# Patient Record
Sex: FEMALE | Race: WHITE | NOT HISPANIC OR LATINO | Employment: FULL TIME | URBAN - METROPOLITAN AREA
[De-identification: names, ages, dates, MRNs, and addresses within clinical notes are randomized per-mention and may not be internally consistent; named-entity substitution may affect disease eponyms.]

---

## 2017-06-26 ENCOUNTER — HISTORICAL (OUTPATIENT)
Dept: RADIOLOGY | Facility: HOSPITAL | Age: 47
End: 2017-06-26

## 2017-07-19 ENCOUNTER — HISTORICAL (OUTPATIENT)
Dept: RADIOLOGY | Facility: HOSPITAL | Age: 47
End: 2017-07-19

## 2017-08-14 ENCOUNTER — HISTORICAL (OUTPATIENT)
Dept: LAB | Facility: HOSPITAL | Age: 47
End: 2017-08-14

## 2017-08-14 LAB
ABS NEUT (OLG): 5.64 X10(3)/MCL (ref 2.1–9.2)
B-HCG SERPL QL: NEGATIVE
BASOPHILS # BLD AUTO: 0 X10(3)/MCL (ref 0–0.2)
BASOPHILS NFR BLD AUTO: 0 %
EOSINOPHIL # BLD AUTO: 0.1 X10(3)/MCL (ref 0–0.9)
EOSINOPHIL NFR BLD AUTO: 1 %
ERYTHROCYTE [DISTWIDTH] IN BLOOD BY AUTOMATED COUNT: 12.2 % (ref 11.5–17)
HCT VFR BLD AUTO: 43.9 % (ref 37–47)
HGB BLD-MCNC: 15.3 GM/DL (ref 12–16)
LYMPHOCYTES # BLD AUTO: 1.4 X10(3)/MCL (ref 0.6–4.6)
LYMPHOCYTES NFR BLD AUTO: 18 %
MCH RBC QN AUTO: 32.6 PG (ref 27–31)
MCHC RBC AUTO-ENTMCNC: 34.9 GM/DL (ref 33–36)
MCV RBC AUTO: 93.6 FL (ref 80–94)
MONOCYTES # BLD AUTO: 0.5 X10(3)/MCL (ref 0.1–1.3)
MONOCYTES NFR BLD AUTO: 6 %
NEUTROPHILS # BLD AUTO: 5.64 X10(3)/MCL (ref 1.4–7.9)
NEUTROPHILS NFR BLD AUTO: 73 %
PLATELET # BLD AUTO: 203 X10(3)/MCL (ref 130–400)
PMV BLD AUTO: 10.4 FL (ref 9.4–12.4)
RBC # BLD AUTO: 4.69 X10(6)/MCL (ref 4.2–5.4)
WBC # SPEC AUTO: 7.7 X10(3)/MCL (ref 4.5–11.5)

## 2017-08-16 ENCOUNTER — HISTORICAL (OUTPATIENT)
Dept: ADMINISTRATIVE | Facility: HOSPITAL | Age: 47
End: 2017-08-16

## 2018-01-24 ENCOUNTER — HISTORICAL (OUTPATIENT)
Dept: RADIOLOGY | Facility: HOSPITAL | Age: 48
End: 2018-01-24

## 2018-01-25 ENCOUNTER — HISTORICAL (OUTPATIENT)
Dept: ADMINISTRATIVE | Facility: HOSPITAL | Age: 48
End: 2018-01-25

## 2018-01-25 LAB — CANCER AG125 SERPL-ACNC: 15.7 IU/ML (ref 1.5–35)

## 2018-07-27 ENCOUNTER — HISTORICAL (OUTPATIENT)
Dept: RADIOLOGY | Facility: HOSPITAL | Age: 48
End: 2018-07-27

## 2019-01-21 ENCOUNTER — HISTORICAL (OUTPATIENT)
Dept: RADIOLOGY | Facility: HOSPITAL | Age: 49
End: 2019-01-21

## 2019-07-29 ENCOUNTER — HISTORICAL (OUTPATIENT)
Dept: RADIOLOGY | Facility: HOSPITAL | Age: 49
End: 2019-07-29

## 2019-08-15 ENCOUNTER — HISTORICAL (OUTPATIENT)
Dept: ADMINISTRATIVE | Facility: HOSPITAL | Age: 49
End: 2019-08-15

## 2019-08-15 LAB — DEPRECATED CALCIDIOL+CALCIFEROL SERPL-MC: 60.41 NG/ML (ref 30–80)

## 2020-10-20 ENCOUNTER — HISTORICAL (OUTPATIENT)
Dept: RADIOLOGY | Facility: HOSPITAL | Age: 50
End: 2020-10-20

## 2021-07-14 ENCOUNTER — HISTORICAL (OUTPATIENT)
Dept: RADIOLOGY | Facility: HOSPITAL | Age: 51
End: 2021-07-14

## 2021-07-19 ENCOUNTER — HISTORICAL (OUTPATIENT)
Dept: ADMINISTRATIVE | Facility: HOSPITAL | Age: 51
End: 2021-07-19

## 2021-07-19 LAB — CANCER AG125 SERPL-ACNC: 27.2 UNIT/ML (ref 0–35)

## 2021-10-27 ENCOUNTER — HISTORICAL (OUTPATIENT)
Dept: RADIOLOGY | Facility: HOSPITAL | Age: 51
End: 2021-10-27

## 2022-04-30 NOTE — OP NOTE
DATE OF SURGERY:    08/16/2017    SURGEON:  Matias Lui MD    PROCEDURE PERFORMED:  Dilation and curettage, NovaSure endometrial ablation, laparoscopic tubal cauterization.    PREOPERATIVE DIAGNOSIS:  Patient is a 46-year-old white female with menorrhagia unresponsive to conservative measures.    POSTOPERATIVE DIAGNOSIS:  Patient is a 46-year-old white female with menorrhagia unresponsive to conservative measures, plus uterine fibroids.    PROCEDURE IN DETAIL:  After the proper consents were obtained, the patient was brought to the operating room, placed in supine position and underwent general anesthesia and intubation without difficulty.  She was then placed in dorsal lithotomy position, prepped and draped in sterile fashion. A weighted speculum was placed in the vaginal vault.  The  anterior lip of the cervix was grasped with a single tooth tenaculum. Uterus was sounded to 9 cm.  The cervix was dilated to a size #8 Hegar.  At this point in time, a sharp curettage was then performed.  Curettage was continued until there was a gritty consistency felt throughout the cavity.  The tissue was sent to pathology.  Next part of the procedure, involved endometrial ablation, which was accomplished with a NovaSure apparatus.  The uterine width was 4.0 and the uterine length was 4.0 also. There was no system malfunctions or complications.  At this point in time, an [place] manipulator was placed into the cervical canal.  A small incision was made underneath the umbilicus and also two fingerbreadth above the pubic symphysis.  The Veress needle was placed into the abdominal cavity and 2  liters of CO2 were allowed to enter the abdominal cavity. Trocars were then introduced without difficulty.  Contents of the pelvic cavity examined and uterus was noted to be slightly enlarged secondary to uterine fibroids. Both adnexa were within normal limits. At this point in time, the right fallopian tube was grasped with the  bipolar cautery and cauterized in three adjacent spots.  A like procedure was done on the opposite fallopian tube.  There was no evidence of any bleeding around the fallopian tubes.  One final inspection of pelvis showed no evidence of any bleeding, no evidence of any visceral injuries, there was no evidence of endometriosis, PID or adhesions.  At this point in time, the CO2 was allowed to escape from the abdominal cavity. Trocars were removed without difficulty. The small     incisions were closed with 3-0 Vicryl subcuticular stitch.  The patient was carefully brought out of dorsal lithotomy position and brought to recovery in stable fashion tolerating procedure well.        ______________________________  Matias Lui MD CEP/THELMA  DD:  08/16/2017  Time:  11:00AM  DT:  08/16/2017  Time:  03:46PM  Job #:  [id number]

## 2022-10-12 DIAGNOSIS — Z12.31 ENCOUNTER FOR SCREENING MAMMOGRAM FOR MALIGNANT NEOPLASM OF BREAST: Primary | ICD-10-CM

## 2022-11-07 DIAGNOSIS — E04.1 NONTOXIC UNINODULAR GOITER: Primary | ICD-10-CM

## 2022-11-08 ENCOUNTER — HOSPITAL ENCOUNTER (OUTPATIENT)
Dept: RADIOLOGY | Facility: HOSPITAL | Age: 52
Discharge: HOME | End: 2022-11-08
Attending: OBSTETRICS & GYNECOLOGY
Payer: COMMERCIAL

## 2022-11-08 ENCOUNTER — HOSPITAL ENCOUNTER (OUTPATIENT)
Dept: RADIOLOGY | Facility: HOSPITAL | Age: 52
Discharge: HOME | End: 2022-11-08
Attending: PEDIATRICS
Payer: COMMERCIAL

## 2022-11-08 DIAGNOSIS — E04.1 NONTOXIC UNINODULAR GOITER: ICD-10-CM

## 2022-11-08 DIAGNOSIS — Z12.31 ENCOUNTER FOR SCREENING MAMMOGRAM FOR MALIGNANT NEOPLASM OF BREAST: ICD-10-CM

## 2022-11-08 PROCEDURE — 77067 SCR MAMMO BI INCL CAD: CPT | Mod: TC

## 2022-11-08 PROCEDURE — 76536 US EXAM OF HEAD AND NECK: CPT | Mod: TC

## 2022-11-08 PROCEDURE — 77063 MAMMO DIGITAL SCREENING BILAT WITH TOMO: ICD-10-PCS | Mod: 26,,, | Performed by: RADIOLOGY

## 2022-11-08 PROCEDURE — 77063 BREAST TOMOSYNTHESIS BI: CPT | Mod: 26,,, | Performed by: RADIOLOGY

## 2022-11-08 PROCEDURE — 77067 MAMMO DIGITAL SCREENING BILAT WITH TOMO: ICD-10-PCS | Mod: 26,,, | Performed by: RADIOLOGY

## 2022-11-08 PROCEDURE — 77067 SCR MAMMO BI INCL CAD: CPT | Mod: 26,,, | Performed by: RADIOLOGY

## 2022-11-22 ENCOUNTER — HOSPITAL ENCOUNTER (OUTPATIENT)
Dept: RADIOLOGY | Facility: HOSPITAL | Age: 52
Discharge: HOME | End: 2022-11-22
Attending: OTOLARYNGOLOGY
Payer: COMMERCIAL

## 2022-11-22 DIAGNOSIS — E04.1 THYROID NODULE: ICD-10-CM

## 2022-11-22 PROCEDURE — 60100 BIOPSY OF THYROID: CPT

## 2022-11-22 PROCEDURE — 76942 ECHO GUIDE FOR BIOPSY: CPT | Mod: TC

## 2022-11-25 LAB
DHEA SERPL-MCNC: NORMAL
ESTROGEN SERPL-MCNC: NORMAL PG/ML
INSULIN SERPL-ACNC: NORMAL U[IU]/ML
LAB AP CLINICAL INFORMATION: NORMAL
LAB AP GROSS DESCRIPTION: NORMAL
LAB AP INTRA OP: NORMAL
LAB AP REPORT FOOTNOTES: NORMAL
T3RU NFR SERPL: NORMAL %

## 2022-12-29 DIAGNOSIS — E04.1 NONTOXIC UNINODULAR GOITER: Primary | ICD-10-CM

## 2023-05-03 ENCOUNTER — HOSPITAL ENCOUNTER (OUTPATIENT)
Dept: RADIOLOGY | Facility: HOSPITAL | Age: 53
Discharge: HOME | End: 2023-05-03
Attending: OTOLARYNGOLOGY
Payer: COMMERCIAL

## 2023-05-03 DIAGNOSIS — E04.1 NONTOXIC UNINODULAR GOITER: ICD-10-CM

## 2023-05-03 PROCEDURE — 76536 US EXAM OF HEAD AND NECK: CPT | Mod: TC

## 2023-09-18 ENCOUNTER — LAB VISIT (OUTPATIENT)
Dept: LAB | Facility: HOSPITAL | Age: 53
End: 2023-09-18
Attending: OBSTETRICS & GYNECOLOGY
Payer: COMMERCIAL

## 2023-09-18 DIAGNOSIS — R10.2 PERINEAL NEURALGIA, UNSPECIFIED LATERALITY: Primary | ICD-10-CM

## 2023-09-18 LAB
APPEARANCE UR: CLEAR
BACTERIA #/AREA URNS AUTO: ABNORMAL /HPF
BILIRUB UR QL STRIP.AUTO: NEGATIVE
COLOR UR: YELLOW
GLUCOSE UR QL STRIP.AUTO: NEGATIVE
KETONES UR QL STRIP.AUTO: NEGATIVE
LEUKOCYTE ESTERASE UR QL STRIP.AUTO: ABNORMAL
NITRITE UR QL STRIP.AUTO: NEGATIVE
PH UR STRIP.AUTO: 6 [PH]
PROT UR QL STRIP.AUTO: NEGATIVE
RBC #/AREA URNS AUTO: <5 /HPF
RBC UR QL AUTO: ABNORMAL
SP GR UR STRIP.AUTO: 1.01 (ref 1–1.03)
SQUAMOUS #/AREA URNS AUTO: <5 /HPF
UROBILINOGEN UR STRIP-ACNC: 0.2
WBC #/AREA URNS AUTO: 6 /HPF

## 2023-09-18 PROCEDURE — 81001 URINALYSIS AUTO W/SCOPE: CPT

## 2023-10-06 ENCOUNTER — HOSPITAL ENCOUNTER (OUTPATIENT)
Dept: RADIOLOGY | Facility: HOSPITAL | Age: 53
Discharge: HOME | End: 2023-10-06
Attending: OTOLARYNGOLOGY
Payer: COMMERCIAL

## 2023-10-06 DIAGNOSIS — E04.1 THYROID NODULE: ICD-10-CM

## 2023-10-06 PROCEDURE — 60100 BIOPSY OF THYROID: CPT

## 2023-10-06 RX ORDER — LIDOCAINE HYDROCHLORIDE 10 MG/ML
INJECTION INFILTRATION; PERINEURAL
Status: DISPENSED
Start: 2023-10-06 | End: 2023-10-06

## 2023-10-09 LAB — PSYCHE PATHOLOGY RESULT: NORMAL

## 2023-11-16 DIAGNOSIS — R10.32 ABDOMINAL PAIN, LEFT LOWER QUADRANT: ICD-10-CM

## 2023-11-16 DIAGNOSIS — R10.11 ABDOMINAL PAIN, RIGHT UPPER QUADRANT: Primary | ICD-10-CM

## 2023-11-16 DIAGNOSIS — R10.30 LOWER ABDOMINAL PAIN: ICD-10-CM

## 2023-12-05 ENCOUNTER — HOSPITAL ENCOUNTER (OUTPATIENT)
Dept: RADIOLOGY | Facility: HOSPITAL | Age: 53
Discharge: HOME | End: 2023-12-05
Attending: PHYSICIAN ASSISTANT
Payer: COMMERCIAL

## 2023-12-05 DIAGNOSIS — R10.11 ABDOMINAL PAIN, RIGHT UPPER QUADRANT: ICD-10-CM

## 2023-12-05 DIAGNOSIS — R10.32 ABDOMINAL PAIN, LEFT LOWER QUADRANT: ICD-10-CM

## 2023-12-05 DIAGNOSIS — R10.30 LOWER ABDOMINAL PAIN: ICD-10-CM

## 2023-12-05 LAB
CREAT SERPL-MCNC: 0.8 MG/DL (ref 0.5–1.4)
SAMPLE: NORMAL

## 2023-12-05 PROCEDURE — 25500020 PHARM REV CODE 255: Performed by: PHYSICIAN ASSISTANT

## 2023-12-05 PROCEDURE — 74178 CT ABD&PLV WO CNTR FLWD CNTR: CPT | Mod: TC

## 2023-12-05 RX ADMIN — IOPAMIDOL 100 ML: 755 INJECTION, SOLUTION INTRAVENOUS at 01:12

## 2024-07-29 DIAGNOSIS — E21.0 PRIMARY HYPERPARATHYROIDISM: Primary | ICD-10-CM

## 2024-08-06 ENCOUNTER — HOSPITAL ENCOUNTER (OUTPATIENT)
Dept: RADIOLOGY | Facility: HOSPITAL | Age: 54
Discharge: HOME | End: 2024-08-06
Attending: INTERNAL MEDICINE
Payer: COMMERCIAL

## 2024-08-06 DIAGNOSIS — E21.0 PRIMARY HYPERPARATHYROIDISM: ICD-10-CM

## 2024-08-06 PROCEDURE — 25500020 PHARM REV CODE 255: Performed by: INTERNAL MEDICINE

## 2024-08-06 PROCEDURE — 70492 CT SFT TSUE NCK W/O & W/DYE: CPT | Mod: TC

## 2024-08-06 RX ADMIN — IOHEXOL 100 ML: 350 INJECTION, SOLUTION INTRAVENOUS at 04:08

## 2024-08-13 DIAGNOSIS — E04.1 NONTOXIC UNINODULAR GOITER: Primary | ICD-10-CM

## 2024-08-14 DIAGNOSIS — E04.1 NONTOXIC SINGLE THYROID NODULE: Primary | ICD-10-CM

## 2024-08-21 ENCOUNTER — HOSPITAL ENCOUNTER (OUTPATIENT)
Dept: RADIOLOGY | Facility: HOSPITAL | Age: 54
Discharge: HOME | End: 2024-08-21
Attending: PEDIATRICS
Payer: COMMERCIAL

## 2024-08-21 DIAGNOSIS — Z12.31 ENCOUNTER FOR SCREENING MAMMOGRAM FOR BREAST CANCER: ICD-10-CM

## 2024-08-21 PROCEDURE — 77067 SCR MAMMO BI INCL CAD: CPT | Mod: TC

## 2024-08-21 PROCEDURE — 77067 SCR MAMMO BI INCL CAD: CPT | Mod: 26,,, | Performed by: STUDENT IN AN ORGANIZED HEALTH CARE EDUCATION/TRAINING PROGRAM

## 2024-08-21 PROCEDURE — 77063 BREAST TOMOSYNTHESIS BI: CPT | Mod: 26,,, | Performed by: STUDENT IN AN ORGANIZED HEALTH CARE EDUCATION/TRAINING PROGRAM

## 2024-09-25 ENCOUNTER — LAB VISIT (OUTPATIENT)
Dept: LAB | Facility: HOSPITAL | Age: 54
End: 2024-09-25
Attending: OTOLARYNGOLOGY
Payer: COMMERCIAL

## 2024-09-25 DIAGNOSIS — E83.52 HYPERCALCEMIA: ICD-10-CM

## 2024-09-25 DIAGNOSIS — E83.52 HYPERCALCEMIA: Primary | ICD-10-CM

## 2024-09-25 LAB — CALCIUM SERPL-MCNC: 9.6 MG/DL (ref 8.4–10.2)

## 2024-09-25 PROCEDURE — 36415 COLL VENOUS BLD VENIPUNCTURE: CPT

## 2024-12-03 DIAGNOSIS — N20.0 CALCULUS OF KIDNEY: Primary | ICD-10-CM

## 2024-12-06 ENCOUNTER — HOSPITAL ENCOUNTER (OUTPATIENT)
Dept: RADIOLOGY | Facility: HOSPITAL | Age: 54
Discharge: HOME | End: 2024-12-06
Attending: PEDIATRICS
Payer: COMMERCIAL

## 2024-12-06 DIAGNOSIS — N20.0 CALCULUS OF KIDNEY: ICD-10-CM

## 2024-12-06 PROCEDURE — 74176 CT ABD & PELVIS W/O CONTRAST: CPT | Mod: TC

## 2025-01-24 ENCOUNTER — HOSPITAL ENCOUNTER (OUTPATIENT)
Dept: RADIOLOGY | Facility: HOSPITAL | Age: 55
Discharge: HOME | End: 2025-01-24
Attending: PEDIATRICS
Payer: COMMERCIAL

## 2025-01-24 DIAGNOSIS — M79.644 PAIN IN FINGER OF RIGHT HAND: ICD-10-CM

## 2025-01-24 PROCEDURE — 73130 X-RAY EXAM OF HAND: CPT | Mod: TC,RT

## 2025-03-24 ENCOUNTER — HOSPITAL ENCOUNTER (OUTPATIENT)
Dept: RADIOLOGY | Facility: CLINIC | Age: 55
Discharge: HOME | End: 2025-03-24
Attending: ORTHOPAEDIC SURGERY
Payer: COMMERCIAL

## 2025-03-24 ENCOUNTER — OFFICE VISIT (OUTPATIENT)
Dept: ORTHOPEDICS | Facility: CLINIC | Age: 55
End: 2025-03-24
Payer: COMMERCIAL

## 2025-03-24 VITALS
WEIGHT: 174 LBS | BODY MASS INDEX: 30.83 KG/M2 | HEART RATE: 85 BPM | SYSTOLIC BLOOD PRESSURE: 125 MMHG | HEIGHT: 63 IN | DIASTOLIC BLOOD PRESSURE: 84 MMHG

## 2025-03-24 DIAGNOSIS — M79.641 HAND PAIN, RIGHT: ICD-10-CM

## 2025-03-24 DIAGNOSIS — M79.641 HAND PAIN, RIGHT: Primary | ICD-10-CM

## 2025-03-24 DIAGNOSIS — M85.441: ICD-10-CM

## 2025-03-24 DIAGNOSIS — M84.344A: ICD-10-CM

## 2025-03-24 PROCEDURE — 1159F MED LIST DOCD IN RCRD: CPT | Mod: CPTII,,, | Performed by: ORTHOPAEDIC SURGERY

## 2025-03-24 PROCEDURE — 3079F DIAST BP 80-89 MM HG: CPT | Mod: CPTII,,, | Performed by: ORTHOPAEDIC SURGERY

## 2025-03-24 PROCEDURE — 73130 X-RAY EXAM OF HAND: CPT | Mod: RT,,, | Performed by: ORTHOPAEDIC SURGERY

## 2025-03-24 PROCEDURE — 3008F BODY MASS INDEX DOCD: CPT | Mod: CPTII,,, | Performed by: ORTHOPAEDIC SURGERY

## 2025-03-24 PROCEDURE — 1160F RVW MEDS BY RX/DR IN RCRD: CPT | Mod: CPTII,,, | Performed by: ORTHOPAEDIC SURGERY

## 2025-03-24 PROCEDURE — 99204 OFFICE O/P NEW MOD 45 MIN: CPT | Mod: ,,, | Performed by: ORTHOPAEDIC SURGERY

## 2025-03-24 PROCEDURE — 3074F SYST BP LT 130 MM HG: CPT | Mod: CPTII,,, | Performed by: ORTHOPAEDIC SURGERY

## 2025-03-24 RX ORDER — NEBIVOLOL 20 MG/1
1 TABLET ORAL EVERY MORNING
COMMUNITY

## 2025-03-24 RX ORDER — HYDROCHLOROTHIAZIDE 25 MG/1
25 TABLET ORAL EVERY MORNING
COMMUNITY

## 2025-03-24 RX ORDER — ROSUVASTATIN CALCIUM 40 MG/1
TABLET, COATED ORAL
COMMUNITY

## 2025-03-24 RX ORDER — NIFEDIPINE 30 MG/1
1 TABLET, EXTENDED RELEASE ORAL EVERY MORNING
COMMUNITY

## 2025-03-25 NOTE — PROGRESS NOTES
"Subjective:    CC: Hand Pain (Right thumb pain and swelling. Pain started in Jan 2025. Patient states she had a second xray with Dr Hood and it showed her thumb was fractured but wasn't able to bring those images. She wanted a second opinion.)       HPI:  Patient comes in today for her 1st visit.  Patient complains of pain swelling mass over her right thumb for the last 2+ months.  She states it is not improved.  She was seen at an outside clinic x2, questionable fracture.  She continues to have pain and discomfort.    ROS: Refer to HPI for pertinent ROS. All other 12 point systems negative.    Objective:  Vitals:    03/24/25 0951   BP: 125/84   BP Location: Left arm   Patient Position: Sitting   Pulse: 85   Weight: 78.9 kg (174 lb)   Height: 5' 3" (1.6 m)        Physical Exam:  Patient is well-nourished developed female she is awake alert and oriented x3 she is in no apparent distress he is pleasant and cooperative.  Examination of the right hand compartment soft and warm.  Skin is intact.  There is no signs symptoms of DVT or infection.  She is point tender about the IP joint of the right thumb this is a loss of flexion and extension.  There is either a soft tissue mass or a bony mass along the volar lateral aspect.  It is tender to palpation.  Neurovascular intact distally.    Images:  X-rays three views right hand demonstrate no obvious fracture or dislocation. Images Reviewed and discussed with patient.    Assessment:  1. 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 Stress fracture of phalanx of finger of right hand, initial encounter  - MRI Thumb Without Contrast Right; Future        Plan:  This time we discussed her physical exam and x-ray findings.  Patient remains be symptomatic over 2+ months.  She does have a enlargement of the IP joint area.  We will proceed with an MRI, I would like see back later this " week for her results.    Follow UP: No follow-ups on file.

## 2025-04-01 ENCOUNTER — HOSPITAL ENCOUNTER (OUTPATIENT)
Dept: RADIOLOGY | Facility: HOSPITAL | Age: 55
Discharge: HOME | End: 2025-04-01
Attending: ORTHOPAEDIC SURGERY
Payer: COMMERCIAL

## 2025-04-01 DIAGNOSIS — M84.344A: ICD-10-CM

## 2025-04-01 DIAGNOSIS — M85.441: ICD-10-CM

## 2025-04-01 PROCEDURE — 73218 MRI UPPER EXTREMITY W/O DYE: CPT | Mod: TC,RT

## 2025-04-03 ENCOUNTER — OFFICE VISIT (OUTPATIENT)
Dept: ORTHOPEDICS | Facility: CLINIC | Age: 55
End: 2025-04-03
Payer: COMMERCIAL

## 2025-04-03 VITALS
HEART RATE: 66 BPM | DIASTOLIC BLOOD PRESSURE: 81 MMHG | WEIGHT: 174.19 LBS | HEIGHT: 63 IN | BODY MASS INDEX: 30.86 KG/M2 | SYSTOLIC BLOOD PRESSURE: 117 MMHG

## 2025-04-03 DIAGNOSIS — M18.11 OSTEOARTHRITIS OF RIGHT THUMB: ICD-10-CM

## 2025-04-03 DIAGNOSIS — S63.681A OTHER SPRAIN OF RIGHT THUMB, INITIAL ENCOUNTER: Primary | ICD-10-CM

## 2025-04-03 RX ORDER — METHYLPREDNISOLONE ACETATE 80 MG/ML
80 INJECTION, SUSPENSION INTRA-ARTICULAR; INTRALESIONAL; INTRAMUSCULAR; SOFT TISSUE
Status: DISCONTINUED | OUTPATIENT
Start: 2025-04-03 | End: 2025-04-03 | Stop reason: HOSPADM

## 2025-04-03 RX ADMIN — METHYLPREDNISOLONE ACETATE 80 MG: 80 INJECTION, SUSPENSION INTRA-ARTICULAR; INTRALESIONAL; INTRAMUSCULAR; SOFT TISSUE at 08:04

## 2025-04-03 NOTE — PROGRESS NOTES
"Subjective:    CC: Results of the Right Hand (R hand MRI results. No new concerns with hand. Still doing the same)       HPI:  Patient returns today for repeat exam.  Patient continues to have some pain, swelling about the IP joint of the right thumb, she did have an MRI, we have discussed her results.    ROS: Refer to HPI for pertinent ROS. All other 12 point systems negative.    Objective:  Vitals:    04/03/25 0806   BP: 117/81   BP Location: Left arm   Patient Position: Sitting   Pulse: 66   Weight: 79 kg (174 lb 2.6 oz)   Height: 5' 3" (1.6 m)        Physical Exam:  Right hand and thumb compartment soft and warm.  Skin is intact.  There is no signs symptoms of DVT or infection.  She remains be tender along the IP joint, there is some mild swelling generalized about the thumb and IP joint area.  She is able to flex and extend about 20°, neurovascular intact distally, nontender elsewhere    Images:  MRI right hand was reviewed. Images Reviewed and discussed with patient.    Assessment:  1. Other sprain of right thumb, initial encounter  - Small Joint Aspiration/Injection: R thumb IP    2. Osteoarthritis of right thumb  - Small Joint Aspiration/Injection: R thumb IP        Plan:  At this time we discussed her physical exam and MRI findings.  We will continue conservative treatment we have discussed her ligament injuries.  She tolerated the steroid injection very well to the right thumb IP joint.  We have discussed some hand exercises.  I would like see back in 6 weeks to see how she is progressing.    Follow UP: No follow-ups on file.    Small Joint Aspiration/Injection: R thumb IP    Date/Time: 4/3/2025 8:15 AM    Performed by: Delroy Lew MD  Authorized by: Delroy Lew MD    Indications:  Pain  Location:  Thumb  Site:  R thumb IP  Medications:  80 mg methylPREDNISolone acetate 80 mg/mL           "

## 2025-04-03 NOTE — PROCEDURES
Small Joint Aspiration/Injection: R thumb IP    Date/Time: 4/3/2025 8:15 AM    Performed by: Delroy Lew MD  Authorized by: Delroy Lew MD    Indications:  Pain  Location:  Thumb  Site:  R thumb IP  Medications:  80 mg methylPREDNISolone acetate 80 mg/mL

## 2025-05-15 ENCOUNTER — OFFICE VISIT (OUTPATIENT)
Dept: ORTHOPEDICS | Facility: CLINIC | Age: 55
End: 2025-05-15
Payer: COMMERCIAL

## 2025-05-15 DIAGNOSIS — M18.11 OSTEOARTHRITIS OF RIGHT THUMB: Primary | ICD-10-CM

## 2025-05-15 DIAGNOSIS — S63.681A OTHER SPRAIN OF RIGHT THUMB, INITIAL ENCOUNTER: ICD-10-CM

## 2025-05-15 PROCEDURE — 1160F RVW MEDS BY RX/DR IN RCRD: CPT | Mod: CPTII,,, | Performed by: ORTHOPAEDIC SURGERY

## 2025-05-15 PROCEDURE — 1159F MED LIST DOCD IN RCRD: CPT | Mod: CPTII,,, | Performed by: ORTHOPAEDIC SURGERY

## 2025-05-15 PROCEDURE — 99213 OFFICE O/P EST LOW 20 MIN: CPT | Mod: ,,, | Performed by: ORTHOPAEDIC SURGERY

## 2025-05-15 NOTE — PROGRESS NOTES
Subjective:    CC: Follow-up of the Right Hand (F/U R thumb inj 4/3/25. Pt states thumb is doing 95% better. Injection helped a lot. Swelling has gone down. Still bothers her when turning on lamps. Still has some sensitivity. No new concerns with it.)       HPI:  Patient returns today for repeat exam.  Patient states her right thumb is doing much better.  She had a previous injection at last visit.  Using a brace, she denies other complaints.    ROS: Refer to HPI for pertinent ROS. All other 12 point systems negative.    Objective:  There were no vitals filed for this visit.     Physical Exam:  Right hand compartment soft and warm.  Skin is intact.  There is no signs symptoms of DVT or infection.  She is no longer tender about the IP joint of the right thumb.  She is able to flex and extend she is stable to varus and valgus stressing there was no triggering negative CMC grind negative Finkelstein exam, neurovascular intact distally.    Images: . Images Reviewed and discussed with patient.    Assessment:  1. Osteoarthritis of right thumb    2. Other sprain of right thumb, initial encounter        Plan:  At this time we discussed her physical exam and previous MRI findings.  She has healed nicely with conservative treatment, she understands to call or return sooner if there is any new problems or difficulties.    Follow UP: No follow-ups on file.